# Patient Record
Sex: MALE | Race: ASIAN | NOT HISPANIC OR LATINO
[De-identification: names, ages, dates, MRNs, and addresses within clinical notes are randomized per-mention and may not be internally consistent; named-entity substitution may affect disease eponyms.]

---

## 2019-03-25 ENCOUNTER — APPOINTMENT (OUTPATIENT)
Dept: UROLOGY | Facility: CLINIC | Age: 74
End: 2019-03-25
Payer: MEDICARE

## 2019-03-25 VITALS
DIASTOLIC BLOOD PRESSURE: 80 MMHG | BODY MASS INDEX: 24.44 KG/M2 | HEIGHT: 69 IN | TEMPERATURE: 98.5 F | SYSTOLIC BLOOD PRESSURE: 129 MMHG | WEIGHT: 165 LBS | HEART RATE: 68 BPM

## 2019-03-25 DIAGNOSIS — R97.20 ELEVATED PROSTATE, SPECIFIC ANTIGEN [PSA]: ICD-10-CM

## 2019-03-25 DIAGNOSIS — N40.0 BENIGN PROSTATIC HYPERPLASIA WITHOUT LOWER URINARY TRACT SYMPMS: ICD-10-CM

## 2019-03-25 PROCEDURE — 99204 OFFICE O/P NEW MOD 45 MIN: CPT

## 2019-03-25 NOTE — HISTORY OF PRESENT ILLNESS
[FreeTextEntry1] : 73 yo Polish speaking M referred for elevated PSA\par Recent PSA was 7.8\par Has had history of elevated PSA in the past\par s/p TRUS-PNB in 2013 and was engative per pt\par Hasn't seen a urologist in 3 years\par No urinary symptoms\par

## 2019-03-25 NOTE — REVIEW OF SYSTEMS
[see HPI] : see HPI [both] : pain during and after intercourse [denies] : denies pain with orgasm [base] : pain in base of penis [Negative] : Heme/Lymph

## 2019-03-25 NOTE — ASSESSMENT
[FreeTextEntry1] : 75 yo M with elevated PSA s/p negative TRUS-PNB\par \par - Reviewed labwork done recently as well as PSA readings going back to 2012. History of PSA going up and down\par - Reviewed possible etiology for elevated PSA\par = Given prior neg biopsy, will plan for prostate MRI\par - FU after MRI

## 2019-03-27 ENCOUNTER — TRANSCRIPTION ENCOUNTER (OUTPATIENT)
Age: 74
End: 2019-03-27

## 2019-04-22 ENCOUNTER — APPOINTMENT (OUTPATIENT)
Dept: MRI IMAGING | Facility: CLINIC | Age: 74
End: 2019-04-22
Payer: MEDICARE

## 2019-04-22 ENCOUNTER — OUTPATIENT (OUTPATIENT)
Dept: OUTPATIENT SERVICES | Facility: HOSPITAL | Age: 74
LOS: 1 days | End: 2019-04-22
Payer: MEDICARE

## 2019-04-22 DIAGNOSIS — R97.20 ELEVATED PROSTATE SPECIFIC ANTIGEN [PSA]: ICD-10-CM

## 2019-04-22 DIAGNOSIS — Z00.00 ENCOUNTER FOR GENERAL ADULT MEDICAL EXAMINATION WITHOUT ABNORMAL FINDINGS: ICD-10-CM

## 2019-04-22 DIAGNOSIS — Z87.19 PERSONAL HISTORY OF OTHER DISEASES OF THE DIGESTIVE SYSTEM: Chronic | ICD-10-CM

## 2019-04-22 PROCEDURE — 72197 MRI PELVIS W/O & W/DYE: CPT | Mod: 26

## 2019-04-22 PROCEDURE — 72197 MRI PELVIS W/O & W/DYE: CPT

## 2019-04-22 PROCEDURE — A9585: CPT

## 2021-01-01 ENCOUNTER — INPATIENT (INPATIENT)
Facility: HOSPITAL | Age: 76
LOS: 0 days | Discharge: HOME | DRG: 312 | End: 2021-01-02
Attending: OTOLARYNGOLOGY | Admitting: HOSPITALIST
Payer: MEDICARE

## 2021-01-01 VITALS
SYSTOLIC BLOOD PRESSURE: 129 MMHG | TEMPERATURE: 98 F | HEIGHT: 71 IN | HEART RATE: 87 BPM | WEIGHT: 160.06 LBS | OXYGEN SATURATION: 97 % | RESPIRATION RATE: 18 BRPM | DIASTOLIC BLOOD PRESSURE: 85 MMHG

## 2021-01-01 DIAGNOSIS — R55 SYNCOPE AND COLLAPSE: ICD-10-CM

## 2021-01-01 DIAGNOSIS — Z87.19 PERSONAL HISTORY OF OTHER DISEASES OF THE DIGESTIVE SYSTEM: Chronic | ICD-10-CM

## 2021-01-01 LAB
ALBUMIN SERPL ELPH-MCNC: 3.4 G/DL — SIGNIFICANT CHANGE UP (ref 3.3–5)
ALP SERPL-CCNC: 81 U/L — SIGNIFICANT CHANGE UP (ref 40–120)
ALT FLD-CCNC: 23 U/L — SIGNIFICANT CHANGE UP (ref 12–78)
ANION GAP SERPL CALC-SCNC: 6 MMOL/L — SIGNIFICANT CHANGE UP (ref 5–17)
ANION GAP SERPL CALC-SCNC: 7 MMOL/L — SIGNIFICANT CHANGE UP (ref 5–17)
APPEARANCE UR: CLEAR — SIGNIFICANT CHANGE UP
APTT BLD: 26.2 SEC — LOW (ref 27.5–35.5)
AST SERPL-CCNC: 23 U/L — SIGNIFICANT CHANGE UP (ref 15–37)
BASOPHILS # BLD AUTO: 0.05 K/UL — SIGNIFICANT CHANGE UP (ref 0–0.2)
BASOPHILS NFR BLD AUTO: 0.5 % — SIGNIFICANT CHANGE UP (ref 0–2)
BILIRUB SERPL-MCNC: 0.2 MG/DL — SIGNIFICANT CHANGE UP (ref 0.2–1.2)
BILIRUB UR-MCNC: NEGATIVE — SIGNIFICANT CHANGE UP
BUN SERPL-MCNC: 16 MG/DL — SIGNIFICANT CHANGE UP (ref 7–23)
BUN SERPL-MCNC: 21 MG/DL — SIGNIFICANT CHANGE UP (ref 7–23)
CALCIUM SERPL-MCNC: 8.3 MG/DL — LOW (ref 8.5–10.1)
CALCIUM SERPL-MCNC: 8.7 MG/DL — SIGNIFICANT CHANGE UP (ref 8.5–10.1)
CHLORIDE SERPL-SCNC: 107 MMOL/L — SIGNIFICANT CHANGE UP (ref 96–108)
CHLORIDE SERPL-SCNC: 111 MMOL/L — HIGH (ref 96–108)
CK SERPL-CCNC: 108 U/L — SIGNIFICANT CHANGE UP (ref 26–308)
CO2 SERPL-SCNC: 26 MMOL/L — SIGNIFICANT CHANGE UP (ref 22–31)
CO2 SERPL-SCNC: 28 MMOL/L — SIGNIFICANT CHANGE UP (ref 22–31)
COLOR SPEC: YELLOW — SIGNIFICANT CHANGE UP
CREAT SERPL-MCNC: 0.78 MG/DL — SIGNIFICANT CHANGE UP (ref 0.5–1.3)
CREAT SERPL-MCNC: 0.88 MG/DL — SIGNIFICANT CHANGE UP (ref 0.5–1.3)
DIFF PNL FLD: ABNORMAL
EOSINOPHIL # BLD AUTO: 0.05 K/UL — SIGNIFICANT CHANGE UP (ref 0–0.5)
EOSINOPHIL NFR BLD AUTO: 0.5 % — SIGNIFICANT CHANGE UP (ref 0–6)
ETHANOL SERPL-MCNC: <10 MG/DL — SIGNIFICANT CHANGE UP (ref 0–10)
GLUCOSE SERPL-MCNC: 111 MG/DL — HIGH (ref 70–99)
GLUCOSE SERPL-MCNC: 97 MG/DL — SIGNIFICANT CHANGE UP (ref 70–99)
GLUCOSE UR QL: NEGATIVE MG/DL — SIGNIFICANT CHANGE UP
HCT VFR BLD CALC: 39.4 % — SIGNIFICANT CHANGE UP (ref 39–50)
HGB BLD-MCNC: 13.4 G/DL — SIGNIFICANT CHANGE UP (ref 13–17)
IMM GRANULOCYTES NFR BLD AUTO: 0.4 % — SIGNIFICANT CHANGE UP (ref 0–1.5)
INR BLD: 1.01 RATIO — SIGNIFICANT CHANGE UP (ref 0.88–1.16)
KETONES UR-MCNC: NEGATIVE — SIGNIFICANT CHANGE UP
LACTATE SERPL-SCNC: 2.4 MMOL/L — HIGH (ref 0.7–2)
LEUKOCYTE ESTERASE UR-ACNC: ABNORMAL
LIDOCAIN IGE QN: 142 U/L — SIGNIFICANT CHANGE UP (ref 73–393)
LYMPHOCYTES # BLD AUTO: 1.43 K/UL — SIGNIFICANT CHANGE UP (ref 1–3.3)
LYMPHOCYTES # BLD AUTO: 13.9 % — SIGNIFICANT CHANGE UP (ref 13–44)
MAGNESIUM SERPL-MCNC: 2 MG/DL — SIGNIFICANT CHANGE UP (ref 1.6–2.6)
MAGNESIUM SERPL-MCNC: 2.3 MG/DL — SIGNIFICANT CHANGE UP (ref 1.6–2.6)
MCHC RBC-ENTMCNC: 32.7 PG — SIGNIFICANT CHANGE UP (ref 27–34)
MCHC RBC-ENTMCNC: 34 GM/DL — SIGNIFICANT CHANGE UP (ref 32–36)
MCV RBC AUTO: 96.1 FL — SIGNIFICANT CHANGE UP (ref 80–100)
MONOCYTES # BLD AUTO: 0.79 K/UL — SIGNIFICANT CHANGE UP (ref 0–0.9)
MONOCYTES NFR BLD AUTO: 7.7 % — SIGNIFICANT CHANGE UP (ref 2–14)
NEUTROPHILS # BLD AUTO: 7.94 K/UL — HIGH (ref 1.8–7.4)
NEUTROPHILS NFR BLD AUTO: 77 % — SIGNIFICANT CHANGE UP (ref 43–77)
NITRITE UR-MCNC: NEGATIVE — SIGNIFICANT CHANGE UP
NT-PROBNP SERPL-SCNC: 54 PG/ML — SIGNIFICANT CHANGE UP (ref 0–125)
PH UR: 6.5 — SIGNIFICANT CHANGE UP (ref 5–8)
PHOSPHATE SERPL-MCNC: 3.2 MG/DL — SIGNIFICANT CHANGE UP (ref 2.5–4.5)
PLATELET # BLD AUTO: 232 K/UL — SIGNIFICANT CHANGE UP (ref 150–400)
POTASSIUM SERPL-MCNC: 2.9 MMOL/L — CRITICAL LOW (ref 3.5–5.3)
POTASSIUM SERPL-MCNC: 3.7 MMOL/L — SIGNIFICANT CHANGE UP (ref 3.5–5.3)
POTASSIUM SERPL-SCNC: 2.9 MMOL/L — CRITICAL LOW (ref 3.5–5.3)
POTASSIUM SERPL-SCNC: 3.7 MMOL/L — SIGNIFICANT CHANGE UP (ref 3.5–5.3)
PROT SERPL-MCNC: 6.9 GM/DL — SIGNIFICANT CHANGE UP (ref 6–8.3)
PROT UR-MCNC: NEGATIVE MG/DL — SIGNIFICANT CHANGE UP
PROTHROM AB SERPL-ACNC: 11.7 SEC — SIGNIFICANT CHANGE UP (ref 10.6–13.6)
RBC # BLD: 4.1 M/UL — LOW (ref 4.2–5.8)
RBC # FLD: 12.2 % — SIGNIFICANT CHANGE UP (ref 10.3–14.5)
SARS-COV-2 RNA SPEC QL NAA+PROBE: SIGNIFICANT CHANGE UP
SODIUM SERPL-SCNC: 142 MMOL/L — SIGNIFICANT CHANGE UP (ref 135–145)
SODIUM SERPL-SCNC: 143 MMOL/L — SIGNIFICANT CHANGE UP (ref 135–145)
SP GR SPEC: 1.01 — SIGNIFICANT CHANGE UP (ref 1.01–1.02)
TROPONIN I SERPL-MCNC: <0.015 NG/ML — SIGNIFICANT CHANGE UP (ref 0.01–0.04)
UROBILINOGEN FLD QL: NEGATIVE MG/DL — SIGNIFICANT CHANGE UP
WBC # BLD: 10.3 K/UL — SIGNIFICANT CHANGE UP (ref 3.8–10.5)
WBC # FLD AUTO: 10.3 K/UL — SIGNIFICANT CHANGE UP (ref 3.8–10.5)

## 2021-01-01 PROCEDURE — 70450 CT HEAD/BRAIN W/O DYE: CPT | Mod: 26

## 2021-01-01 PROCEDURE — 93306 TTE W/DOPPLER COMPLETE: CPT

## 2021-01-01 PROCEDURE — 95816 EEG AWAKE AND DROWSY: CPT

## 2021-01-01 PROCEDURE — 99223 1ST HOSP IP/OBS HIGH 75: CPT

## 2021-01-01 PROCEDURE — 36415 COLL VENOUS BLD VENIPUNCTURE: CPT

## 2021-01-01 PROCEDURE — 80048 BASIC METABOLIC PNL TOTAL CA: CPT

## 2021-01-01 PROCEDURE — 72125 CT NECK SPINE W/O DYE: CPT | Mod: 26

## 2021-01-01 PROCEDURE — 86803 HEPATITIS C AB TEST: CPT

## 2021-01-01 PROCEDURE — 84100 ASSAY OF PHOSPHORUS: CPT

## 2021-01-01 PROCEDURE — 83605 ASSAY OF LACTIC ACID: CPT

## 2021-01-01 PROCEDURE — 86769 SARS-COV-2 COVID-19 ANTIBODY: CPT

## 2021-01-01 PROCEDURE — 70551 MRI BRAIN STEM W/O DYE: CPT | Mod: 26

## 2021-01-01 PROCEDURE — 70551 MRI BRAIN STEM W/O DYE: CPT

## 2021-01-01 PROCEDURE — 85025 COMPLETE CBC W/AUTO DIFF WBC: CPT

## 2021-01-01 PROCEDURE — 93010 ELECTROCARDIOGRAM REPORT: CPT

## 2021-01-01 PROCEDURE — 95816 EEG AWAKE AND DROWSY: CPT | Mod: 26

## 2021-01-01 PROCEDURE — 83735 ASSAY OF MAGNESIUM: CPT

## 2021-01-01 RX ORDER — ACETAMINOPHEN 500 MG
650 TABLET ORAL EVERY 4 HOURS
Refills: 0 | Status: DISCONTINUED | OUTPATIENT
Start: 2021-01-01 | End: 2021-01-02

## 2021-01-01 RX ORDER — SODIUM CHLORIDE 9 MG/ML
1000 INJECTION INTRAMUSCULAR; INTRAVENOUS; SUBCUTANEOUS ONCE
Refills: 0 | Status: COMPLETED | OUTPATIENT
Start: 2021-01-01 | End: 2021-01-01

## 2021-01-01 RX ORDER — POTASSIUM CHLORIDE 20 MEQ
10 PACKET (EA) ORAL ONCE
Refills: 0 | Status: COMPLETED | OUTPATIENT
Start: 2021-01-01 | End: 2021-01-01

## 2021-01-01 RX ORDER — POTASSIUM CHLORIDE 20 MEQ
40 PACKET (EA) ORAL ONCE
Refills: 0 | Status: COMPLETED | OUTPATIENT
Start: 2021-01-01 | End: 2021-01-01

## 2021-01-01 RX ORDER — ONDANSETRON 8 MG/1
4 TABLET, FILM COATED ORAL EVERY 6 HOURS
Refills: 0 | Status: DISCONTINUED | OUTPATIENT
Start: 2021-01-01 | End: 2021-01-02

## 2021-01-01 RX ORDER — SENNA PLUS 8.6 MG/1
2 TABLET ORAL AT BEDTIME
Refills: 0 | Status: DISCONTINUED | OUTPATIENT
Start: 2021-01-01 | End: 2021-01-02

## 2021-01-01 RX ORDER — LOSARTAN POTASSIUM 100 MG/1
50 TABLET, FILM COATED ORAL DAILY
Refills: 0 | Status: DISCONTINUED | OUTPATIENT
Start: 2021-01-01 | End: 2021-01-02

## 2021-01-01 RX ORDER — PANTOPRAZOLE SODIUM 20 MG/1
40 TABLET, DELAYED RELEASE ORAL DAILY
Refills: 0 | Status: DISCONTINUED | OUTPATIENT
Start: 2021-01-01 | End: 2021-01-02

## 2021-01-01 RX ORDER — IBUPROFEN 200 MG
600 TABLET ORAL EVERY 6 HOURS
Refills: 0 | Status: DISCONTINUED | OUTPATIENT
Start: 2021-01-01 | End: 2021-01-02

## 2021-01-01 RX ADMIN — Medication 40 MILLIEQUIVALENT(S): at 05:44

## 2021-01-01 RX ADMIN — SODIUM CHLORIDE 1000 MILLILITER(S): 9 INJECTION INTRAMUSCULAR; INTRAVENOUS; SUBCUTANEOUS at 04:12

## 2021-01-01 RX ADMIN — LOSARTAN POTASSIUM 50 MILLIGRAM(S): 100 TABLET, FILM COATED ORAL at 10:51

## 2021-01-01 RX ADMIN — Medication 100 MILLIEQUIVALENT(S): at 05:44

## 2021-01-01 RX ADMIN — PANTOPRAZOLE SODIUM 40 MILLIGRAM(S): 20 TABLET, DELAYED RELEASE ORAL at 10:51

## 2021-01-01 RX ADMIN — SODIUM CHLORIDE 1000 MILLILITER(S): 9 INJECTION INTRAMUSCULAR; INTRAVENOUS; SUBCUTANEOUS at 05:30

## 2021-01-01 NOTE — H&P ADULT - NSHPLABSRESULTS_GEN_ALL_CORE
LABS:                        13.4   10.30 )-----------( 232      ( 2021 03:50 )             39.4         142  |  107  |  21  ----------------------------<  111<H>  2.9<LL>   |  28  |  0.88    Ca    8.3<L>      2021 03:50  Mg     2.0         TPro  6.9  /  Alb  3.4  /  TBili  0.2  /  DBili  x   /  AST  23  /  ALT  23  /  AlkPhos  81      PT/INR - ( 2021 03:50 )   PT: 11.7 sec;   INR: 1.01 ratio         PTT - ( 2021 03:50 )  PTT:26.2 sec  Urinalysis Basic - ( 2021 03:50 )    Color: Yellow / Appearance: Clear / S.015 / pH: x  Gluc: x / Ketone: Negative  / Bili: Negative / Urobili: Negative mg/dL   Blood: x / Protein: Negative mg/dL / Nitrite: Negative   Leuk Esterase: Trace / RBC: 3-5 /HPF / WBC 3-5   Sq Epi: x / Non Sq Epi: Occasional / Bacteria: Few      CARDIAC MARKERS ( 2021 03:50 )  <0.015 ng/mL / x     / 108 U/L / x     / x         CT Head No Cont (21 @ 04:56) >  IMPRESSION:    CT HEAD:  No acute intracranial abnormality.    CT CERVICAL SPINE: No acute fracture or acute subluxation.    EKG: poor study, artifact, appears to be sinus without ischemic changes

## 2021-01-01 NOTE — CONSULT NOTE ADULT - SUBJECTIVE AND OBJECTIVE BOX
Neurology Consult requested by:   Patient is a 75y old  Male who presents with a chief complaint of syncope vs. seizure (01 Jan 2021 10:11)    HPI: 75 man PMH of HTN, AI, GERD who presented to the ER after he was found unconscious at home. He was with his family, had dinner and over the course of the night had a total of 3 glasses of champagne. Around 2am, he felt tired and told his family he was heading up to bed. He went upstairs and his family heard a loud thud. His daughter found him on the floor, face up, unconscious. He started to regain consciousness after a few minutes. He didn't remember the events of the night initially even what occurred prior to him going upstairs. But after a few hours, he started to recall the night, but still doesn't know how he fell. He remembers feeling dizzy while going upstairs and  felt dizzy for a few hours after he regained consciousness. No blurry vision or focal weakness. No dysarthria/dysphagia. No n/v/d/abd pain. No chest pain/sob. No preceding illness or complaints.       ROS: all 10 systems reviewed and is as above otherwise negative.     PMH: as above  PSH: denies   SOcial: tobacco-1ppd X 10 years,  quit 45 years ago, ETOH-occasional  Allergies: NKDA    PAST MEDICAL & SURGICAL HISTORY:  Venous insufficiency    GERD (gastroesophageal reflux disease)    Former smoker    Lyme disease  July 3rd- treated    Hypertension    H/O inguinal hernia      FAMILY HISTORY:  No significant family history      Social Hx:  Nonsmoker, no drug or alcohol use  Medications and Allergies ReviewedMEDICATIONS  (STANDING):  losartan 50 milliGRAM(s) Oral daily  pantoprazole    Tablet 40 milliGRAM(s) Oral daily     ROS: Pertinent positives in HPI, all other ROS were reviewed and are negative.      Examination:   Vital Signs Last 24 Hrs  T(C): 36.7 (01 Jan 2021 12:01), Max: 36.9 (01 Jan 2021 03:29)  T(F): 98 (01 Jan 2021 12:01), Max: 98.5 (01 Jan 2021 03:29)  HR: 72 (01 Jan 2021 12:01) (72 - 87)  BP: 147/84 (01 Jan 2021 12:01) (129/85 - 149/89)  BP(mean): 91 (01 Jan 2021 05:46) (91 - 91)  RR: 18 (01 Jan 2021 12:01) (17 - 19)  SpO2: 100% (01 Jan 2021 12:01) (97% - 100%)  General: Cooperative, NAD   NECK: supple, no masses  ENT: Normal hearing   Vascular : no carotid bruits,   Lungs: CTAB  Chest: RRR, no murmurs  Extremities: nontender, no edema  Musculoskeletal: no adventitious movements, no joint stiffness  Skin: no rash    Neurological Examination:  NIHSS:0  MS: AOx3. Appropriately interactive, normal affect. Speech fluent w/o paraphasic error, repetition, naming, reading is intact   CN: VFFTC, PERLL, EOMI, V1-3 sensation intact, face symmetric, hearing intact, palate elevates symmetrically, tongue midline, SCM equal bilaterally  Motor: normal bulk and tone, no tremor, rigidity or bradykinesia.  5/5 all over   Sens: Intact to light touch.    Reflexes: 2/4 all over, downgoing toes b/l  Coord:  No dysmetria, ANNE-MARIE intact   Gait: Cannot test    Labs: Reviewed  Imaging: Reviewed    < from: CT Head No Cont (01.01.21 @ 04:56) >    FINDINGS:  CT HEAD:    There is no CT evidence of acute intracranial hemorrhage, mass effect, midline shift, or acute territorial infarct.    The ventricles and sulci are normal in size and configuration. The basal cisterns are patent.    The visualized paranasal sinuses are clear.    Left mastoid air cells are clear. Underdeveloped right mastoid.    The calvarium, skull base and extracranial soft tissues are grossly intact. The pituitary gland conforms to the sellar floor suggesting a partially empty sella.      CT CERVICAL SPINE: No acute fracture deformity or acute subluxation. Vertebral body heights and alignment are maintained. No prevertebral soft tissue thickening demonstrated. Mild multilevel cervical spondylosis is evident. Bony fusion of bilateral facets of C2 and C3. Visualized lung apices are clear. The left vallecula is collapsed, which limits evaluation.      IMPRESSION:    CT HEAD:  No acute intracranial abnormality.    CT CERVICAL SPINE: No acute fracture or acute subluxation.    < end of copied text >    < from: EEG Awake or Drowsy (01.01.21 @ 13:00) >   EXAM:  EEG AWAKE AND DROWSY        PROCEDURE DATE:  01/01/2021        INTERPRETATION:  16-channel EEG recording performed at the patient's bedside for this 75-year-old man with a syncopal episode. Rule out seizures.    The background activity consist of bilateral symmetrical occipitally dominant low amplitude 9-10 Hz activity which attenuates with eye opening. Bilateral symmetrical diffuse 15-20, low amplitude activity.  Drowsiness characterized by symmetrical attenuation of the background activity.    No focal slowing or epileptiform activity noted.    Stepped photic stimulation did not demonstrate any abnormalities.    Impression: Normal EEG performed with the patient awake and drowsy.    < end of copied text >

## 2021-01-01 NOTE — CONSULT NOTE ADULT - SUBJECTIVE AND OBJECTIVE BOX
HPI:  PCP: sancho sukhdeep  [place]    C/C:     HPI: 75 PMH of HTN, AI, GERD who presented to the ER after he was found unconscious at home. He was with his family, had dinner and over the course of the night had a total of 3 glasses of champagne. Around 2am, he felt tired and told his family he was heading up to bed. He went upstairs and his family heard a loud thud. His daughter found him on the floor, face up, unconscious. He started to regain consciousness after a few minutes. He didn't remember the events of the night initially even what occurred prior to him going upstairs. But after a few hours, he started to recall the night, but still doesn't know how he fell. He remembers feeling dizzy while going upstairs and  felt dizzy for a few hours after he regained consciousness. No blurry vision or focal weakness. No dysarthria/dysphagia. No n/v/d/abd pain. NO chest pain/sob. No fevers/chills/rigors. no similar episodes in past.   +tongue bite.   Last echo was a few years ago.   No h/o stroke/MI.    Reviewed history again w/ pt & daughter who translates via cell.  Pt remembers walking upstairs & feeling lightheaded.  Denies chest pain, palpitations, dyspnea or other cardiac sytmpoms.  then remembers waking up on floor.  No tongue biting.  loss of bowel/bladder control..  Prolonged confusion for hrs after episode.      PAST MEDICAL AND SURGICAL HISTORY:  Venous insufficiency  GERD (gastroesophageal reflux disease)  Former smoker  Lyme disease  July 3rd- treated  Hypertension  H/O inguinal hernia        ALLERGIES:  Allergies  No Known Allergies  Intolerances    SOCIAL HISTORY:  tobacco-1ppd X 10 years,  quit 45 years ago, ETOH-occasional      FAMILY  HISTORY:  No significant family history    MEDICATIONS:  OUTPATIENT:  Home Medications:  losartan 50 mg oral tablet: 1 tab(s) orally once a day (01 Jan 2021 10:11)  omeprazole 20 mg oral delayed release capsule: 1 cap(s) orally once a day (01 Jan 2021 10:11)        INPATIENT:  MEDICATIONS  (STANDING):  losartan 50 milliGRAM(s) Oral daily  pantoprazole    Tablet 40 milliGRAM(s) Oral daily    MEDICATIONS  (PRN):  acetaminophen   Tablet .. 650 milliGRAM(s) Oral every 4 hours PRN Mild Pain (1 - 3)  ibuprofen  Tablet. 600 milliGRAM(s) Oral every 6 hours PRN Moderate Pain (4 - 6)  ondansetron Injectable 4 milliGRAM(s) IV Push every 6 hours PRN Nausea  senna 2 Tablet(s) Oral at bedtime PRN Constipation    MEDICATIONS  (PRN):  acetaminophen   Tablet .. 650 milliGRAM(s) Oral every 4 hours PRN Mild Pain (1 - 3)  ibuprofen  Tablet. 600 milliGRAM(s) Oral every 6 hours PRN Moderate Pain (4 - 6)  ondansetron Injectable 4 milliGRAM(s) IV Push every 6 hours PRN Nausea  senna 2 Tablet(s) Oral at bedtime PRN Constipation    REVIEW OF SYSTEMS:    CONSTITUTIONAL: No weakness, fevers or chills  EYES/ENT: No visual changes;  No vertigo or throat pain   NECK: No pain or stiffness  RESPIRATORY: No cough, wheezing, hemoptysis; No shortness of breath  CARDIOVASCULAR: No chest pain or palpitations  GASTROINTESTINAL: No abdominal or epigastric pain. No nausea, vomiting, or hematemesis; No diarrhea or constipation. No melena or hematochezia.  GENITOURINARY: No dysuria, frequency or hematuria  NEUROLOGICAL: No numbness or weakness  SKIN: No itching, burning, rashes, or lesions   All other review of systems is negative unless indicated above    Vital Signs Last 24 Hrs  T(C): 36.6 (01 Jan 2021 16:12), Max: 36.9 (01 Jan 2021 03:29)  T(F): 97.9 (01 Jan 2021 16:12), Max: 98.5 (01 Jan 2021 03:29)  HR: 68 (01 Jan 2021 16:12) (68 - 87)  BP: 124/72 (01 Jan 2021 16:12) (124/72 - 149/89)  BP(mean): 91 (01 Jan 2021 05:46) (91 - 91)  RR: 18 (01 Jan 2021 16:12) (17 - 19)  SpO2: 100% (01 Jan 2021 16:12) (97% - 100%      PHYSICAL EXAM:    Constitutional: NAD, awake and alert,  HEENT: PERR, EOMI,  No oral cyananosis.  Neck:  supple,  No JVD  Respiratory: Breath sounds are clear bilaterally, No wheezing, rales or rhonchi  Cardiovascular: S1 and S2, regular rate and rhythm, soft holosystoilc murmur  , no rubs or gallops  Gastrointestinal: Bowel Sounds present, soft, nontender.   Extremities: No peripheral edema. No clubbing or cyanosis.  Vascular: 2+ peripheral pulses  Neurological: A/O x 3, no focal deficits    LABS: All Labs Reviewed:                        13.4   10.30 )-----------( 232      ( 01 Jan 2021 03:50 )             39.4     01 Jan 2021 10:15    143    |  111    |  16     ----------------------------<  97     3.7     |  26     |  0.78   01 Jan 2021 03:50    142    |  107    |  21     ----------------------------<  111    2.9     |  28     |  0.88     Ca    8.7        01 Jan 2021 10:15  Ca    8.3        01 Jan 2021 03:50  Phos  3.2       01 Jan 2021 10:15  Mg     2.3       01 Jan 2021 10:15  Mg     2.0       01 Jan 2021 03:50    TPro  6.9    /  Alb  3.4    /  TBili  0.2    /  DBili  x      /  AST  23     /  ALT  23     /  AlkPhos  81     01 Jan 2021 03:50    PT/INR - ( 01 Jan 2021 03:50 )   PT: 11.7 sec;   INR: 1.01 ratio         PTT - ( 01 Jan 2021 03:50 )  PTT:26.2 sec  CARDIAC MARKERS ( 01 Jan 2021 03:50 )  <0.015 ng/mL / x     / 108 U/L / x     / x          Blood Culture:   01-01 @ 03:50  Pro Bnp 54      ===============================  EKG: NSR,  no ischemic changes.    TELE: no events on tele.  ===============================  Echo< from: Transthoracic Echocardiogram (08.01.14 @ 14:20) >  ------------------------------------------------------------------------  Conclusions:  1. Normal mitral valve. Mild mitral regurgitation.  2. Calcified trileaflet aortic valve with normal opening.  Mild aortic regurgitation.  3. Mild left atrial enlargement.  LA volume index = 29  cc/m2. Hypermobile interatrial septum.  4. Normal left ventricular systolic function. EF=55-60%.  The distal inferoseptum and distal inferior segments are  mildly hypokinetic.  5. Mild diastolic dysfunction (Stage I).  6. Normal right ventricular size and function.  7. Normal tricuspid valve. Mild-moderate tricuspid  regurgitation.  8. Estimated pulmonary artery systolic pressure equals 31  mm Hg, assuming right atrial pressure equals 8 mm Hg,  consistent with normal pulmonary pressures.  ------------------------------------------------------------------------  Confirmed on  8/1/2014 - 16:35:39 by Kash Barkley M.D.  ------------------------------------------------------------------------  < from: Cardiac Cath Lab - Adult (08.06.14 @ 11:35) >  VENTRICLES: EF estimated was 60 %. No AS or MR.  CORONARY VESSELS: The coronary circulation is right dominant.  LM:   --  LM: Normal.  LAD:   --  LAD: The vessel was large sized. Angiography showed minor  luminal irregularities with no flow limiting lesions.  CX:   --  Circumflex: Normal. The vessel was large sized.  RCA:   --  RCA: The vessel was large sized. Angiography showed minor  luminal irregularities with no flow limiting lesions.  COMPLICATIONS: There were no complications.  DIAGNOSTIC RECOMMENDATIONS: Nonobstructive CAD. Normal LV size and  function. No AS or MR. No complications. Continue maximal medical therapy  and risk factor modification. Follow up with Dr. Escobar in 1-2 weeks.  INTERVENTIONAL RECOMMENDATIONS: NonobstructiveCAD. Normal LV size and  function. No AS or MR. No complications. Continue maximal medical therapy  and risk factor modification. Follow up with Dr. Escobar in 1-2 weeks.  Prepared and signed by  Dani Zapata M.D.  Signed 08/13/2014 10:51:58    < end of copied text >  < from: Nuclear Stress Test-Exercise (08.01.14 @ 00:00) >  GATED ANALYSIS:  Post-stress gated wall motion analysis was performed (LVEF  = 61 %;LVEDV = 113 ml.)  ------------------------------------------------------------------------  IMPRESSIONS:Abnormal Study  * Exercise capacity: 13 METS, Excellent for age and  gender.  * Chest Pain: No chest pain with exercise.  * Symptom: No Symptom.  * HR Response: Appropriate.  * BP Response: Appropriate.  * Heart Rhythm: Sinus Rhythm - 63 BPM.  * ECG Abnormalities: None.  * Arrhythmia: None.  * There is a medium sized, moderate defect in apical wall  that is reversible consistent with ischemia.  * Post-stress gated wall motion analysis was performed  (LVEF = 61 %;LVEDV = 113 ml.)  * No previous Nuclear/Stress exam.  * Dr. Escobar was notified of findings.  ------------------------------------------------------------------------  Confirmed on  8/1/2014 - 18:24:07 by Agustín Escobar M.D.  ------------------------------------------------------------------------    < end of copied text >

## 2021-01-01 NOTE — ED ADULT TRIAGE NOTE - CHIEF COMPLAINT QUOTE
Pt BIBA s/p unwitnessed fall at home.  As per EMS pts family heard a thud and found pt on floor.  Pt unsure of LOC, states he feels dazed.  No injuries noted, denies any pain.  On ASA 81mg.  Telugu speaking.  MD Turner aware. Neuro alert called.

## 2021-01-01 NOTE — ED ADULT NURSE REASSESSMENT NOTE - NS ED NURSE REASSESS COMMENT FT1
Pt O2 sat at 79%, NRB on max and NC on 6 L, O2 improved to 88%. MD Turner aware and MS Mercado consulted. Pt appears comfortable. Plan to recheck pt in 10 minutes and call Rogelio.

## 2021-01-01 NOTE — PATIENT PROFILE ADULT - PHONE #
Cherise Mc              /  [phone]( [place] Cherise Mc   [phone]             /  [phone]( [place]

## 2021-01-01 NOTE — ED PROVIDER NOTE - NS ED ROS FT
Review of Systems:  	•	CONSTITUTIONAL: no fever, syncope vs sz  	•	SKIN: no rash  	•	RESPIRATORY: no shortness of breath  	•	CARDIAC: no chest pain  	•	GI:  no abd pain, no nausea, no vomiting, no diarrhea  	•	GENITO-URINARY:  no dysuria  	•	MUSCULOSKELETAL:  no back pain  	•	NEUROLOGIC: no weakness  	•	ALLERGY: no rhinorrhea  	•	PSYSCHIATRIC: appropriate concern about symptoms

## 2021-01-01 NOTE — ED ADULT NURSE NOTE - CHIEF COMPLAINT QUOTE
Pt BIBA s/p unwitnessed fall at home.  As per EMS pts family heard a thud and found pt on floor.  Pt unsure of LOC, states he feels dazed.  No injuries noted, denies any pain.  On ASA 81mg.  Kiswahili speaking.  MD Turner aware. Neuro alert called.

## 2021-01-01 NOTE — H&P ADULT - NSHPPHYSICALEXAM_GEN_ALL_CORE
Vital Signs Last 24 Hrs  T(C): 36.8 (01 Jan 2021 05:46), Max: 36.9 (01 Jan 2021 03:29)  T(F): 98.3 (01 Jan 2021 05:46), Max: 98.5 (01 Jan 2021 03:29)  HR: 79 (01 Jan 2021 05:46) (79 - 87)  BP: 133/74 (01 Jan 2021 05:46) (129/85 - 133/74)  BP(mean): 91 (01 Jan 2021 05:46) (91 - 91)  RR: 19 (01 Jan 2021 05:46) (18 - 19)  SpO2: 100% (01 Jan 2021 05:46) (97% - 100%)    PHYSICAL EXAM:    GENERAL: Comfortable, no acute distress   HEAD:  Normocephalic, +small hematoma/bruising over right temporal area  EYES: EOMI, PERRLA  HEENT: Moist mucous membranes, +tongue bite  NECK: Supple, No JVD  NERVOUS SYSTEM:  Alert & Oriented X3, Motor Strength 5/5 B/L upper and lower extremities, no tremor, finger nose intact, CNII-XII grossly intact.  CHEST/LUNG: Clear to auscultation bilaterally  HEART: Regular rate and rhythm, +diastolic murmur.   ABDOMEN: Soft, Nontender, Nondistended, Bowel sounds present  GENITOURINARY: Voiding, no palpable bladder  EXTREMITIES:   No clubbing, cyanosis, or edema  MUSCULOSKELETAL- No muscle tenderness, no joint tenderness  SKIN-no rash

## 2021-01-01 NOTE — ED PROVIDER NOTE - PMH
Former smoker    GERD (gastroesophageal reflux disease)    Hypertension    Lyme disease  July 3rd- treated  Venous insufficiency

## 2021-01-01 NOTE — CONSULT NOTE ADULT - ASSESSMENT
75 year old man admitted after episode of LOC, non focal exam. No prior hx of CVA-non focal exam, CT of head showed no acute changes, MR ordered as per medicine, doubt CVA;  no hx of seizures, EEG is normal,  seizures- unlikely.  r/o cardiac cause of syncope.  suggest:   f/u MR head  cardiology eval

## 2021-01-01 NOTE — ED PROVIDER NOTE - PROGRESS NOTE DETAILS
jyoti: Discussed need to admit with patient & discussed risk and benefits.  Patient agreed to admission.  Discussed case w/ admitting doctor - agreed to admit to their service. will place bridge orders. Accepting physician said: APPROVE PT TO MOVE   prior to inpatient team evaluation

## 2021-01-01 NOTE — CONSULT NOTE ADULT - ATTENDING COMMENTS
Dorian Shay M.D.  Cardiology, [facility]  Cell: [phone]  Office:   [phone] ([facility] Office)  [phone] ([facility] Office)

## 2021-01-01 NOTE — ED PROVIDER NOTE - OBJECTIVE STATEMENT
Pertinent HPI/PMH/PSH/FHx/SHx and Review of Systems contained within  HPI:  Patient p/w CC   x  days, new onset vs acute on chronic.   PMH/PSH relevant for: MVP, HTN,   ROS negative for: fever, Chest pain, SOB, Nausea, vomiting, diarrhea, abdominal pain, dysuria    FamilyHx and SocialHx not otherwise contributory Pertinent HPI/PMH/PSH/FHx/SHx and Review of Systems contained within  HPI:  Patient p/w bibems from home after pt had unwitnessed fall & family found pt unconscious, new onset. pt currently w/o any symptoms.  He was with his family, had dinner and over the course of the night had a total of 3 glasses of champagne. Around 2am, he felt tired and told his family he was heading up to bed. He went upstairs and his family heard a loud thud. His daughter found him on the floor, face up, unconscious. He started to regain consciousness after a few minutes. He didn't remember the events of the night initially even what occurred prior to him going upstairs. But after a few hours, he started to recall the night, but still doesn't know how he fell. He remembers feeling dizzy while going upstairs and  felt dizzy for a few hours after he regained consciousness. No blurry vision or focal weakness. No dysarthria/dysphagia. No n/v/d/abd pain. NO chest pain/sob. No fevers/chills/rigors. no similar episodes in past.  +tongue bite.   PMH/PSH relevant for: HTN, AI, GERD  ROS negative for: fever, Chest pain, SOB, Nausea, vomiting, diarrhea, abdominal pain, dysuria    FamilyHx and SocialHx not otherwise contributory

## 2021-01-01 NOTE — PROVIDER CONTACT NOTE (OTHER) - SITUATION
LEFT MESSAGE WITH RACHID FROM DR. ALATORRE'S ANSWERING SERVICE.
LEFT MESSAGE WITH SONAL FROM DR. NORRIS'S ANSWERING SERVICE.

## 2021-01-01 NOTE — H&P ADULT - ASSESSMENT
75M,pmh as above a/w    1. Syncope vs. Seizure:  -admit to tele.   -check orthostatics  -MRI brain  -EEG per neurology.   -2Decho  -cardio/neuro eval.     2. Hypokalemia:  -repleted in ed  -repeat now.     3. HTN:  -continue arb with hold parameters    4. GERD:  -PPI    5. DVT px:  -IMPROVE VTE Individual Risk Assessment    RISK                                                                Points    [  ] Previous VTE                                                  3    [  ] Thrombophilia                                               2    [  ] Lower limb paralysis                                      2        (unable to hold up >15 seconds)      [  ] Current Cancer                                              2         (within 6 months)    [  ] Immobilization > 24 hrs                                1    [  ] ICU/CCU stay > 24 hours                              1    [ x ] Age > 60                                                      1    IMPROVE VTE Score __1_______    IMPROVE Score 0-1: Low Risk, No VTE prophylaxis required for most patients, encourage ambulation.   IMPROVE Score 2-3: At risk, pharmacologic VTE prophylaxis is indicated for most patients (in the absence of a contraindication)  IMPROVE Score > or = 4: High Risk, pharmacologic VTE prophylaxis is indicated for most patients (in the absence of a contraindication)    Adv directives: full code

## 2021-01-01 NOTE — H&P ADULT - HISTORY OF PRESENT ILLNESS
PCP: sancho matos  PMH Of  HTN, AI,     3 glasses of champagne.   eat dinner    went upstairs to go to bed, heard thump , daughter ran upstairs, found him unconscious face up on the floorl   unconscious X few minutes.   Didn't know what happened. even prior to passing out. SHort term memory loss, then slowly come back.   he said very dizzy when climbing upstairs.   remembered going into his room.   Doesn't remember fall.     tongue bite        PMH: as sarahy  PSH: none.   SOcial: tobacco quit 45 years ago, 10 years 1ppd.  Allergies: NKDA  Home me d:          PCP: sancho sukhdeep  [place]    C/C:     HPI: 75 PMH of HTN, AI, GERD who presented to the ER after he was found unconscious at home. He was with his family, had dinner and over the course of the night had a total of 3 glasses of champagne. Around 2am, he felt tired and told his family he was heading up to bed. He went upstairs and his family heard a loud thud. His daughter found him on the floor, face up, unconscious. He started to regain consciousness after a few minutes. He didn't remember the events of the night initially even what occurred prior to him going upstairs. But after a few hours, he started to recall the night, but still doesn't know how he fell. He remembers feeling dizzy while going upstairs and  felt dizzy for a few hours after he regained consciousness. No blurry vision or focal weakness. No dysarthria/dysphagia. No n/v/d/abd pain. NO chest pain/sob. No fevers/chills/rigors. no similar episodes in past.   +tongue bite.   Last echo was a few years ago.   No h/o stroke/MI.    ROS: all 10 systems reviewed and is as above otherwise negative.     PMH: as above  PSH: denies   SOcial: tobacco-1ppd X 10 years,  quit 45 years ago, ETOH-occasional  Allergies: NKDA

## 2021-01-01 NOTE — CONSULT NOTE ADULT - PROBLEM SELECTOR RECOMMENDATION 9
Etiology unclear.  May be due to dehydration, cannot exclude seizure given post episode confusion.  cont. tele monintoring x 12-24 hrs.  Echo ordered for AM.  Serial trops neg.  If no arrhythmias by tommorrow & echo normal consider d/c w/ OP ambulatory monitoring.

## 2021-01-02 ENCOUNTER — TRANSCRIPTION ENCOUNTER (OUTPATIENT)
Age: 76
End: 2021-01-02

## 2021-01-02 VITALS
SYSTOLIC BLOOD PRESSURE: 113 MMHG | DIASTOLIC BLOOD PRESSURE: 63 MMHG | OXYGEN SATURATION: 100 % | HEART RATE: 77 BPM | RESPIRATION RATE: 18 BRPM | TEMPERATURE: 98 F

## 2021-01-02 LAB
ANION GAP SERPL CALC-SCNC: 3 MMOL/L — LOW (ref 5–17)
BASOPHILS # BLD AUTO: 0.03 K/UL — SIGNIFICANT CHANGE UP (ref 0–0.2)
BASOPHILS NFR BLD AUTO: 0.7 % — SIGNIFICANT CHANGE UP (ref 0–2)
BUN SERPL-MCNC: 13 MG/DL — SIGNIFICANT CHANGE UP (ref 7–23)
CALCIUM SERPL-MCNC: 9 MG/DL — SIGNIFICANT CHANGE UP (ref 8.5–10.1)
CHLORIDE SERPL-SCNC: 112 MMOL/L — HIGH (ref 96–108)
CO2 SERPL-SCNC: 28 MMOL/L — SIGNIFICANT CHANGE UP (ref 22–31)
CREAT SERPL-MCNC: 0.8 MG/DL — SIGNIFICANT CHANGE UP (ref 0.5–1.3)
EOSINOPHIL # BLD AUTO: 0.07 K/UL — SIGNIFICANT CHANGE UP (ref 0–0.5)
EOSINOPHIL NFR BLD AUTO: 1.6 % — SIGNIFICANT CHANGE UP (ref 0–6)
GLUCOSE SERPL-MCNC: 97 MG/DL — SIGNIFICANT CHANGE UP (ref 70–99)
HCT VFR BLD CALC: 41.2 % — SIGNIFICANT CHANGE UP (ref 39–50)
HCV AB S/CO SERPL IA: 0.07 S/CO — SIGNIFICANT CHANGE UP (ref 0–0.99)
HCV AB SERPL-IMP: SIGNIFICANT CHANGE UP
HGB BLD-MCNC: 13.5 G/DL — SIGNIFICANT CHANGE UP (ref 13–17)
IMM GRANULOCYTES NFR BLD AUTO: 0.2 % — SIGNIFICANT CHANGE UP (ref 0–1.5)
LYMPHOCYTES # BLD AUTO: 1.04 K/UL — SIGNIFICANT CHANGE UP (ref 1–3.3)
LYMPHOCYTES # BLD AUTO: 23.1 % — SIGNIFICANT CHANGE UP (ref 13–44)
MAGNESIUM SERPL-MCNC: 2.4 MG/DL — SIGNIFICANT CHANGE UP (ref 1.6–2.6)
MCHC RBC-ENTMCNC: 32.5 PG — SIGNIFICANT CHANGE UP (ref 27–34)
MCHC RBC-ENTMCNC: 32.8 GM/DL — SIGNIFICANT CHANGE UP (ref 32–36)
MCV RBC AUTO: 99.3 FL — SIGNIFICANT CHANGE UP (ref 80–100)
MONOCYTES # BLD AUTO: 0.48 K/UL — SIGNIFICANT CHANGE UP (ref 0–0.9)
MONOCYTES NFR BLD AUTO: 10.6 % — SIGNIFICANT CHANGE UP (ref 2–14)
NEUTROPHILS # BLD AUTO: 2.88 K/UL — SIGNIFICANT CHANGE UP (ref 1.8–7.4)
NEUTROPHILS NFR BLD AUTO: 63.8 % — SIGNIFICANT CHANGE UP (ref 43–77)
PHOSPHATE SERPL-MCNC: 3.2 MG/DL — SIGNIFICANT CHANGE UP (ref 2.5–4.5)
PLATELET # BLD AUTO: 223 K/UL — SIGNIFICANT CHANGE UP (ref 150–400)
POTASSIUM SERPL-MCNC: 4.2 MMOL/L — SIGNIFICANT CHANGE UP (ref 3.5–5.3)
POTASSIUM SERPL-SCNC: 4.2 MMOL/L — SIGNIFICANT CHANGE UP (ref 3.5–5.3)
RBC # BLD: 4.15 M/UL — LOW (ref 4.2–5.8)
RBC # FLD: 12.6 % — SIGNIFICANT CHANGE UP (ref 10.3–14.5)
SARS-COV-2 IGG SERPL QL IA: NEGATIVE — SIGNIFICANT CHANGE UP
SARS-COV-2 IGM SERPL IA-ACNC: <0.1 INDEX — SIGNIFICANT CHANGE UP
SODIUM SERPL-SCNC: 143 MMOL/L — SIGNIFICANT CHANGE UP (ref 135–145)
WBC # BLD: 4.51 K/UL — SIGNIFICANT CHANGE UP (ref 3.8–10.5)
WBC # FLD AUTO: 4.51 K/UL — SIGNIFICANT CHANGE UP (ref 3.8–10.5)

## 2021-01-02 PROCEDURE — 93306 TTE W/DOPPLER COMPLETE: CPT | Mod: 26

## 2021-01-02 PROCEDURE — 99239 HOSP IP/OBS DSCHRG MGMT >30: CPT

## 2021-01-02 RX ORDER — SODIUM CHLORIDE 9 MG/ML
1000 INJECTION INTRAMUSCULAR; INTRAVENOUS; SUBCUTANEOUS
Refills: 0 | Status: DISCONTINUED | OUTPATIENT
Start: 2021-01-02 | End: 2021-01-02

## 2021-01-02 RX ADMIN — LOSARTAN POTASSIUM 50 MILLIGRAM(S): 100 TABLET, FILM COATED ORAL at 10:10

## 2021-01-02 RX ADMIN — SODIUM CHLORIDE 250 MILLILITER(S): 9 INJECTION INTRAMUSCULAR; INTRAVENOUS; SUBCUTANEOUS at 10:42

## 2021-01-02 RX ADMIN — PANTOPRAZOLE SODIUM 40 MILLIGRAM(S): 20 TABLET, DELAYED RELEASE ORAL at 10:10

## 2021-01-02 NOTE — DISCHARGE NOTE PROVIDER - CARE PROVIDER_API CALL
Dorian Shay)  Cardiology  [address] [place] [address]  Phone: [phone]  Fax: [phone]  Follow Up Time:    Dr. HATFIELD, [place]  Phone: (   )    -  Fax: (   )    -  Follow Up Time: 1 week

## 2021-01-02 NOTE — DISCHARGE NOTE PROVIDER - NSDCMRMEDTOKEN_GEN_ALL_CORE_FT
losartan 50 mg oral tablet: 1 tab(s) orally once a day  omeprazole 20 mg oral delayed release capsule: 1 cap(s) orally once a day

## 2021-01-02 NOTE — DISCHARGE NOTE PROVIDER - CARE PROVIDERS DIRECT ADDRESSES
escitalopram (LEXAPRO) 20 MG tablet     Last Written Prescription Date: 2/9/2017  Last Fill Quantity: 90, # refills: 0  Last Office Visit with [facility] primary care provider:  2/9/2017        Last PHQ-9 score on record=   PHQ-9 SCORE 1/9/2017   Total Score 9                  ,DirectAddress_Unknown

## 2021-01-02 NOTE — DISCHARGE NOTE PROVIDER - NSDCCPCAREPLAN_GEN_ALL_CORE_FT
PRINCIPAL DISCHARGE DIAGNOSIS  Diagnosis: Syncope  Assessment and Plan of Treatment: due to dehydration.   stay hydrated.   follow up with cardiology for heart monitor.      SECONDARY DISCHARGE DIAGNOSES  Diagnosis: Seizure-like activity  Assessment and Plan of Treatment:

## 2021-01-02 NOTE — DISCHARGE NOTE PROVIDER - PROVIDER TOKENS
PROVIDER:[TOKEN:[[id number]]] FREE:[LAST:[Dr. HATFIELD],FIRST:[preston],PHONE:[(   )    -],FAX:[(   )    -],FOLLOWUP:[1 week]]

## 2021-01-02 NOTE — DISCHARGE NOTE NURSING/CASE MANAGEMENT/SOCIAL WORK - PATIENT PORTAL LINK FT
You can access the FollowMyHealth Patient Portal offered by [facility] by registering at the following website: [email or web address]. By joining [facility]’s FollowMyHealth portal, you will also be able to view your health information using other applications (apps) compatible with our system.

## 2021-01-02 NOTE — DISCHARGE NOTE PROVIDER - HOSPITAL COURSE
75 PMH of HTN, AI, GERD who presented to the ER after he was found unconscious at home. He was with his family, had dinner and over the course of the night had a total of 3 glasses of champagne. Around 2am, he felt tired and told his family he was heading up to bed. He went upstairs and his family heard a loud thud. His daughter found him on the floor, face up, unconscious. He started to regain consciousness after a few minutes. He didn't remember the events of the night initially even what occurred prior to him going upstairs. But after a few hours, he started to recall the night, but still doesn't know how he fell. He remembers feeling dizzy while going upstairs and  felt dizzy for a few hours after he regained consciousness. No blurry vision or focal weakness. No dysarthria/dysphagia. No n/v/d/abd pain. NO chest pain/sob. No fevers/chills/rigors. no similar episodes in past.   +tongue bite.   Last echo was a few years ago.   No h/o stroke/MI. He was admitted to telemetry which was negative for significant arrhythmia. had 2Decho which preliminarily shows mild to mod AI, preserved LVEF. He was found to have orthostatic hyoptension which improved with ivf.   He underwent EEG and MRI brain which was negative. It was felt less likely he had a seizure per neurology.   At this time it is felt his syncope may have been related to orthostatic hypotension/dehydration in setting of etoh. He will be discharged home today and he will f/u with cardiology for outpt monitor.     Vital Signs Last 24 Hrs  T(C): 36.6 (02 Jan 2021 10:06), Max: 36.9 (01 Jan 2021 10:45)  T(F): 97.9 (02 Jan 2021 10:06), Max: 98.5 (01 Jan 2021 10:45)  HR: 59 (02 Jan 2021 10:06) (59 - 72)  BP: 114/70 (02 Jan 2021 10:06) (114/70 - 149/89)  RR: 18 (02 Jan 2021 10:06) (17 - 18)  SpO2: 100% (02 Jan 2021 10:06) (95% - 100%)    GENERAL: Comfortable, no acute distress  HEAD:  Atraumatic, Normocephalic  EYES: EOMI, PERRLA  HEENT: Moist mucous membranes  NECK: Supple, No JVD  NERVOUS SYSTEM:  Alert & Oriented X3, Motor Strength 5/5 B/L upper and lower extremities  CHEST/LUNG: Clear to auscultation bilaterally  HEART: Regular rate and rhythm; No murmurs, rubs, or gallops  ABDOMEN: Soft, Nontender, Nondistended; Bowel sounds present  GENITOURINARY- Voiding, no palpable bladder  EXTREMITIES:  No clubbing, cyanosis, or edema  MUSCULOSKELETAL- No muscle tenderness, No joint tenderness  SKIN-no rash    LABS:                        13.5   4.51  )-----------( 223      ( 02 Jan 2021 07:39 )             41.2     01-02    143  |  112<H>  |  13  ----------------------------<  97  4.2   |  28  |  0.80    Ca    9.0      02 Jan 2021 07:39  Phos  3.2     01-02  Mg     2.4     01-02    TPro  6.9  /  Alb  3.4  /  TBili  0.2  /  DBili  x   /  AST  23  /  ALT  23  /  AlkPhos  81  01-01    PT/INR - ( 01 Jan 2021 03:50 )   PT: 11.7 sec;   INR: 1.01 ratio    PTT - ( 01 Jan 2021 03:50 )  PTT:26.2 sec    CT Cervical Spine No Cont (01.01.21 @ 04:57) >  IMPRESSION:  CT HEAD:  No acute intracranial abnormality.  CT CERVICAL SPINE: No acute fracture or acute subluxation.    MR Head No Cont (01.01.21 @ 15:03)   IMPRESSION: No acute hemorrhage, mass or mass effect.    EEG Awake or Drowsy (01.01.21 @ 13:00) >  Impression: Normal EEG performed with the patient awake and drowsy.      FINAL DIAGNOSIS:  1. Syncope likely related to orthostatic hypotension in setting of dehydration/etoh.   2. Hypokalemia  3. Mild to mod AI  4. HTN  5. GERD    time taken for dc 45 min  d/w pt/daughter/cardiology  summary to be faxed to pcp.   75 PMH of HTN, AI, GERD who presented to the ER after he was found unconscious at home. He was with his family, had dinner and over the course of the night had a total of 3 glasses of champagne. Around 2am, he felt tired and told his family he was heading up to bed. He went upstairs and his family heard a loud thud. His daughter found him on the floor, face up, unconscious. He started to regain consciousness after a few minutes. He didn't remember the events of the night initially even what occurred prior to him going upstairs. But after a few hours, he started to recall the night, but still doesn't know how he fell. He remembers feeling dizzy while going upstairs and  felt dizzy for a few hours after he regained consciousness. No blurry vision or focal weakness. No dysarthria/dysphagia. No n/v/d/abd pain. NO chest pain/sob. No fevers/chills/rigors. no similar episodes in past.   +tongue bite.   Last echo was a few years ago.   No h/o stroke/MI. He was admitted to telemetry which was negative for significant arrhythmia. had 2Decho which preliminarily shows mild to mod AI, preserved LVEF. He was found to have orthostatic hyoptension which improved with ivf.   He underwent EEG and MRI brain which was negative. It was felt less likely he had a seizure per neurology.   At this time it is felt his syncope may have been related to orthostatic hypotension/dehydration in setting of etoh. He will be discharged home today and he will f/u with cardiology for outpt monitor.     Vital Signs Last 24 Hrs  T(C): 36.6 (02 Jan 2021 10:06), Max: 36.9 (01 Jan 2021 10:45)  T(F): 97.9 (02 Jan 2021 10:06), Max: 98.5 (01 Jan 2021 10:45)  HR: 59 (02 Jan 2021 10:06) (59 - 72)  BP: 114/70 (02 Jan 2021 10:06) (114/70 - 149/89)  RR: 18 (02 Jan 2021 10:06) (17 - 18)  SpO2: 100% (02 Jan 2021 10:06) (95% - 100%)    GENERAL: Comfortable, no acute distress  HEAD:  Atraumatic, Normocephalic  EYES: EOMI, PERRLA  HEENT: Moist mucous membranes  NECK: Supple, No JVD  NERVOUS SYSTEM:  Alert & Oriented X3, Motor Strength 5/5 B/L upper and lower extremities  CHEST/LUNG: Clear to auscultation bilaterally  HEART: Regular rate and rhythm; No murmurs, rubs, or gallops  ABDOMEN: Soft, Nontender, Nondistended; Bowel sounds present  GENITOURINARY- Voiding, no palpable bladder  EXTREMITIES:  No clubbing, cyanosis, or edema  MUSCULOSKELETAL- No muscle tenderness, No joint tenderness  SKIN-no rash    LABS:                        13.5   4.51  )-----------( 223      ( 02 Jan 2021 07:39 )             41.2     01-02    143  |  112<H>  |  13  ----------------------------<  97  4.2   |  28  |  0.80    Ca    9.0      02 Jan 2021 07:39  Phos  3.2     01-02  Mg     2.4     01-02    TPro  6.9  /  Alb  3.4  /  TBili  0.2  /  DBili  x   /  AST  23  /  ALT  23  /  AlkPhos  81  01-01    PT/INR - ( 01 Jan 2021 03:50 )   PT: 11.7 sec;   INR: 1.01 ratio    PTT - ( 01 Jan 2021 03:50 )  PTT:26.2 sec    CT Cervical Spine No Cont (01.01.21 @ 04:57) >  IMPRESSION:  CT HEAD:  No acute intracranial abnormality.  CT CERVICAL SPINE: No acute fracture or acute subluxation.    MR Head No Cont (01.01.21 @ 15:03)   IMPRESSION: No acute hemorrhage, mass or mass effect.    EEG Awake or Drowsy (01.01.21 @ 13:00) >  Impression: Normal EEG performed with the patient awake and drowsy.      FINAL DIAGNOSIS:  1. Syncope likely related to orthostatic hypotension in setting of dehydration/etoh.   2. Hypokalemia  3. Mild to mod AI  4. HTN  5. GERD    time taken for dc 45 min  d/w pt/cardiology  left message for wife  summary to be faxed to pcp.   75 PMH of HTN, AI, GERD who presented to the ER after he was found unconscious at home. He was with his family, had dinner and over the course of the night had a total of 3 glasses of champagne. Around 2am, he felt tired and told his family he was heading up to bed. He went upstairs and his family heard a loud thud. His daughter found him on the floor, face up, unconscious. He started to regain consciousness after a few minutes. He didn't remember the events of the night initially even what occurred prior to him going upstairs. But after a few hours, he started to recall the night, but still doesn't know how he fell. He remembers feeling dizzy while going upstairs and  felt dizzy for a few hours after he regained consciousness. No blurry vision or focal weakness. No dysarthria/dysphagia. No n/v/d/abd pain. NO chest pain/sob. No fevers/chills/rigors. no similar episodes in past.   +tongue bite.   Last echo was a few years ago.   No h/o stroke/MI. He was admitted to telemetry which was negative for significant arrhythmia. had 2Decho which preliminarily shows mild to mod AI, preserved LVEF. He was found to have orthostatic hyoptension which improved with ivf.   He underwent EEG and MRI brain which was negative. It was felt less likely he had a seizure per neurology.   At this time it is felt his syncope may have been related to orthostatic hypotension/dehydration in setting of etoh. He will be discharged home today and he will f/u with cardiology for outpt monitor.     Vital Signs Last 24 Hrs  T(C): 36.6 (02 Jan 2021 10:06), Max: 36.9 (01 Jan 2021 10:45)  T(F): 97.9 (02 Jan 2021 10:06), Max: 98.5 (01 Jan 2021 10:45)  HR: 59 (02 Jan 2021 10:06) (59 - 72)  BP: 114/70 (02 Jan 2021 10:06) (114/70 - 149/89)  RR: 18 (02 Jan 2021 10:06) (17 - 18)  SpO2: 100% (02 Jan 2021 10:06) (95% - 100%)    GENERAL: Comfortable, no acute distress  HEAD:  Atraumatic, Normocephalic  EYES: EOMI, PERRLA  HEENT: Moist mucous membranes  NECK: Supple, No JVD  NERVOUS SYSTEM:  Alert & Oriented X3, Motor Strength 5/5 B/L upper and lower extremities  CHEST/LUNG: Clear to auscultation bilaterally  HEART: Regular rate and rhythm; No murmurs, rubs, or gallops  ABDOMEN: Soft, Nontender, Nondistended; Bowel sounds present  GENITOURINARY- Voiding, no palpable bladder  EXTREMITIES:  No clubbing, cyanosis, or edema  MUSCULOSKELETAL- No muscle tenderness, No joint tenderness  SKIN-no rash    LABS:                        13.5   4.51  )-----------( 223      ( 02 Jan 2021 07:39 )             41.2     01-02    143  |  112<H>  |  13  ----------------------------<  97  4.2   |  28  |  0.80    Ca    9.0      02 Jan 2021 07:39  Phos  3.2     01-02  Mg     2.4     01-02    TPro  6.9  /  Alb  3.4  /  TBili  0.2  /  DBili  x   /  AST  23  /  ALT  23  /  AlkPhos  81  01-01    PT/INR - ( 01 Jan 2021 03:50 )   PT: 11.7 sec;   INR: 1.01 ratio    PTT - ( 01 Jan 2021 03:50 )  PTT:26.2 sec    CT Cervical Spine No Cont (01.01.21 @ 04:57) >  IMPRESSION:  CT HEAD:  No acute intracranial abnormality.  CT CERVICAL SPINE: No acute fracture or acute subluxation.    MR Head No Cont (01.01.21 @ 15:03)   IMPRESSION: No acute hemorrhage, mass or mass effect.    EEG Awake or Drowsy (01.01.21 @ 13:00) >  Impression: Normal EEG performed with the patient awake and drowsy.      FINAL DIAGNOSIS:  1. Syncope likely related to orthostatic hypotension in setting of dehydration/etoh.   2. Hypokalemia  3. Mild to mod AI  4. HTN  5. GERD    time taken for dc 45 min  d/w pt/cardiology  d/w wife  summary to be faxed to pcp.

## 2021-01-06 DIAGNOSIS — Z86.19 PERSONAL HISTORY OF OTHER INFECTIOUS AND PARASITIC DISEASES: ICD-10-CM

## 2021-01-06 DIAGNOSIS — I35.1 NONRHEUMATIC AORTIC (VALVE) INSUFFICIENCY: ICD-10-CM

## 2021-01-06 DIAGNOSIS — I10 ESSENTIAL (PRIMARY) HYPERTENSION: ICD-10-CM
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
## 2021-01-06 DIAGNOSIS — I95.1 ORTHOSTATIC HYPOTENSION: ICD-10-CM

## 2021-01-06 DIAGNOSIS — Z79.82 LONG TERM (CURRENT) USE OF ASPIRIN: ICD-10-CM

## 2021-01-06 DIAGNOSIS — E87.6 HYPOKALEMIA: ICD-10-CM

## 2021-01-06 DIAGNOSIS — Z79.899 OTHER LONG TERM (CURRENT) DRUG THERAPY: ICD-10-CM

## 2021-01-06 DIAGNOSIS — F10.10 ALCOHOL ABUSE, UNCOMPLICATED: ICD-10-CM

## 2021-01-06 DIAGNOSIS — E86.0 DEHYDRATION: ICD-10-CM

## 2021-01-06 DIAGNOSIS — Z87.891 PERSONAL HISTORY OF NICOTINE DEPENDENCE: ICD-10-CM

## 2021-01-06 DIAGNOSIS — R56.9 UNSPECIFIED CONVULSIONS: ICD-10-CM

## 2021-01-06 DIAGNOSIS — K21.9 GASTRO-ESOPHAGEAL REFLUX DISEASE WITHOUT ESOPHAGITIS: ICD-10-CM

## 2021-07-26 ENCOUNTER — APPOINTMENT (OUTPATIENT)
Dept: UROLOGY | Facility: CLINIC | Age: 76
End: 2021-07-26
